# Patient Record
Sex: FEMALE | Race: WHITE | NOT HISPANIC OR LATINO | URBAN - METROPOLITAN AREA
[De-identification: names, ages, dates, MRNs, and addresses within clinical notes are randomized per-mention and may not be internally consistent; named-entity substitution may affect disease eponyms.]

---

## 2017-10-02 ENCOUNTER — OUTPATIENT (OUTPATIENT)
Dept: OUTPATIENT SERVICES | Facility: HOSPITAL | Age: 42
LOS: 1 days | End: 2017-10-02
Payer: COMMERCIAL

## 2017-10-02 DIAGNOSIS — Z3A.00 WEEKS OF GESTATION OF PREGNANCY NOT SPECIFIED: ICD-10-CM

## 2017-10-02 DIAGNOSIS — O26.899 OTHER SPECIFIED PREGNANCY RELATED CONDITIONS, UNSPECIFIED TRIMESTER: ICD-10-CM

## 2017-10-02 LAB — FIBRONECTIN FETAL SPEC QL: NEGATIVE — SIGNIFICANT CHANGE UP

## 2017-10-02 PROCEDURE — 76817 TRANSVAGINAL US OBSTETRIC: CPT

## 2017-10-02 PROCEDURE — 82731 ASSAY OF FETAL FIBRONECTIN: CPT

## 2017-10-02 PROCEDURE — 59025 FETAL NON-STRESS TEST: CPT

## 2017-10-02 PROCEDURE — 99214 OFFICE O/P EST MOD 30 MIN: CPT

## 2017-11-29 ENCOUNTER — OUTPATIENT (OUTPATIENT)
Dept: OUTPATIENT SERVICES | Facility: HOSPITAL | Age: 42
LOS: 1 days | End: 2017-11-29
Payer: COMMERCIAL

## 2017-11-29 DIAGNOSIS — Z01.818 ENCOUNTER FOR OTHER PREPROCEDURAL EXAMINATION: ICD-10-CM

## 2017-11-29 LAB
APTT BLD: 25.1 SEC — LOW (ref 27.5–37.4)
BLD GP AB SCN SERPL QL: NEGATIVE — SIGNIFICANT CHANGE UP
BLD GP AB SCN SERPL QL: NEGATIVE — SIGNIFICANT CHANGE UP
HCT VFR BLD CALC: 35.3 % — SIGNIFICANT CHANGE UP (ref 34.5–45)
HGB BLD-MCNC: 11.8 G/DL — SIGNIFICANT CHANGE UP (ref 11.5–15.5)
INR BLD: 0.84 — LOW (ref 0.88–1.16)
MCHC RBC-ENTMCNC: 31.8 PG — SIGNIFICANT CHANGE UP (ref 27–34)
MCHC RBC-ENTMCNC: 33.4 G/DL — SIGNIFICANT CHANGE UP (ref 32–36)
MCV RBC AUTO: 95.1 FL — SIGNIFICANT CHANGE UP (ref 80–100)
PLATELET # BLD AUTO: 217 K/UL — SIGNIFICANT CHANGE UP (ref 150–400)
PROTHROM AB SERPL-ACNC: 9.3 SEC — LOW (ref 9.8–12.7)
RBC # BLD: 3.71 M/UL — LOW (ref 3.8–5.2)
RBC # FLD: 12.8 % — SIGNIFICANT CHANGE UP (ref 10.3–16.9)
RH IG SCN BLD-IMP: POSITIVE — SIGNIFICANT CHANGE UP
RH IG SCN BLD-IMP: POSITIVE — SIGNIFICANT CHANGE UP
WBC # BLD: 8.9 K/UL — SIGNIFICANT CHANGE UP (ref 3.8–10.5)
WBC # FLD AUTO: 8.9 K/UL — SIGNIFICANT CHANGE UP (ref 3.8–10.5)

## 2017-11-29 PROCEDURE — 86900 BLOOD TYPING SEROLOGIC ABO: CPT

## 2017-11-29 PROCEDURE — 86901 BLOOD TYPING SEROLOGIC RH(D): CPT

## 2017-11-29 PROCEDURE — 85027 COMPLETE CBC AUTOMATED: CPT

## 2017-11-29 PROCEDURE — 86850 RBC ANTIBODY SCREEN: CPT

## 2017-11-29 PROCEDURE — 85730 THROMBOPLASTIN TIME PARTIAL: CPT

## 2017-11-29 PROCEDURE — 85610 PROTHROMBIN TIME: CPT

## 2017-12-01 ENCOUNTER — RESULT REVIEW (OUTPATIENT)
Age: 42
End: 2017-12-01

## 2017-12-01 ENCOUNTER — INPATIENT (INPATIENT)
Facility: HOSPITAL | Age: 42
LOS: 3 days | Discharge: HOME | End: 2017-12-05
Attending: OBSTETRICS & GYNECOLOGY | Admitting: OBSTETRICS & GYNECOLOGY
Payer: COMMERCIAL

## 2017-12-01 VITALS — WEIGHT: 149.91 LBS | HEIGHT: 63 IN

## 2017-12-01 LAB — T PALLIDUM AB TITR SER: NEGATIVE — SIGNIFICANT CHANGE UP

## 2017-12-01 RX ORDER — OXYCODONE AND ACETAMINOPHEN 5; 325 MG/1; MG/1
1 TABLET ORAL
Qty: 0 | Refills: 0 | Status: DISCONTINUED | OUTPATIENT
Start: 2017-12-01 | End: 2017-12-05

## 2017-12-01 RX ORDER — FERROUS SULFATE 325(65) MG
325 TABLET ORAL DAILY
Qty: 0 | Refills: 0 | Status: DISCONTINUED | OUTPATIENT
Start: 2017-12-01 | End: 2017-12-05

## 2017-12-01 RX ORDER — HEPARIN SODIUM 5000 [USP'U]/ML
5000 INJECTION INTRAVENOUS; SUBCUTANEOUS EVERY 12 HOURS
Qty: 0 | Refills: 0 | Status: DISCONTINUED | OUTPATIENT
Start: 2017-12-01 | End: 2017-12-05

## 2017-12-01 RX ORDER — SIMETHICONE 80 MG/1
80 TABLET, CHEWABLE ORAL EVERY 4 HOURS
Qty: 0 | Refills: 0 | Status: DISCONTINUED | OUTPATIENT
Start: 2017-12-01 | End: 2017-12-05

## 2017-12-01 RX ORDER — LANOLIN
1 OINTMENT (GRAM) TOPICAL
Qty: 0 | Refills: 0 | Status: DISCONTINUED | OUTPATIENT
Start: 2017-12-01 | End: 2017-12-05

## 2017-12-01 RX ORDER — CEFAZOLIN SODIUM 1 G
2000 VIAL (EA) INJECTION ONCE
Qty: 0 | Refills: 0 | Status: COMPLETED | OUTPATIENT
Start: 2017-12-01 | End: 2017-12-01

## 2017-12-01 RX ORDER — CITRIC ACID/SODIUM CITRATE 300-500 MG
30 SOLUTION, ORAL ORAL ONCE
Qty: 0 | Refills: 0 | Status: COMPLETED | OUTPATIENT
Start: 2017-12-01 | End: 2017-12-01

## 2017-12-01 RX ORDER — DOCUSATE SODIUM 100 MG
100 CAPSULE ORAL
Qty: 0 | Refills: 0 | Status: DISCONTINUED | OUTPATIENT
Start: 2017-12-01 | End: 2017-12-05

## 2017-12-01 RX ORDER — NALOXONE HYDROCHLORIDE 4 MG/.1ML
0.1 SPRAY NASAL
Qty: 0 | Refills: 0 | Status: DISCONTINUED | OUTPATIENT
Start: 2017-12-01 | End: 2017-12-05

## 2017-12-01 RX ORDER — ACETAMINOPHEN 500 MG
650 TABLET ORAL EVERY 6 HOURS
Qty: 0 | Refills: 0 | Status: DISCONTINUED | OUTPATIENT
Start: 2017-12-01 | End: 2017-12-05

## 2017-12-01 RX ORDER — ONDANSETRON 8 MG/1
4 TABLET, FILM COATED ORAL EVERY 6 HOURS
Qty: 0 | Refills: 0 | Status: DISCONTINUED | OUTPATIENT
Start: 2017-12-01 | End: 2017-12-05

## 2017-12-01 RX ORDER — DIPHENHYDRAMINE HCL 50 MG
25 CAPSULE ORAL EVERY 6 HOURS
Qty: 0 | Refills: 0 | Status: DISCONTINUED | OUTPATIENT
Start: 2017-12-01 | End: 2017-12-05

## 2017-12-01 RX ORDER — OXYTOCIN 10 UNIT/ML
41.67 VIAL (ML) INJECTION
Qty: 20 | Refills: 0 | Status: DISCONTINUED | OUTPATIENT
Start: 2017-12-01 | End: 2017-12-05

## 2017-12-01 RX ORDER — TETANUS TOXOID, REDUCED DIPHTHERIA TOXOID AND ACELLULAR PERTUSSIS VACCINE, ADSORBED 5; 2.5; 8; 8; 2.5 [IU]/.5ML; [IU]/.5ML; UG/.5ML; UG/.5ML; UG/.5ML
0.5 SUSPENSION INTRAMUSCULAR ONCE
Qty: 0 | Refills: 0 | Status: DISCONTINUED | OUTPATIENT
Start: 2017-12-01 | End: 2017-12-05

## 2017-12-01 RX ORDER — SODIUM CHLORIDE 9 MG/ML
1000 INJECTION, SOLUTION INTRAVENOUS
Qty: 0 | Refills: 0 | Status: DISCONTINUED | OUTPATIENT
Start: 2017-12-01 | End: 2017-12-01

## 2017-12-01 RX ORDER — OXYCODONE AND ACETAMINOPHEN 5; 325 MG/1; MG/1
2 TABLET ORAL EVERY 6 HOURS
Qty: 0 | Refills: 0 | Status: DISCONTINUED | OUTPATIENT
Start: 2017-12-01 | End: 2017-12-05

## 2017-12-01 RX ORDER — IBUPROFEN 200 MG
600 TABLET ORAL EVERY 6 HOURS
Qty: 0 | Refills: 0 | Status: DISCONTINUED | OUTPATIENT
Start: 2017-12-01 | End: 2017-12-05

## 2017-12-01 RX ORDER — GLYCERIN ADULT
1 SUPPOSITORY, RECTAL RECTAL AT BEDTIME
Qty: 0 | Refills: 0 | Status: DISCONTINUED | OUTPATIENT
Start: 2017-12-01 | End: 2017-12-05

## 2017-12-01 RX ORDER — METOCLOPRAMIDE HCL 10 MG
10 TABLET ORAL ONCE
Qty: 0 | Refills: 0 | Status: DISCONTINUED | OUTPATIENT
Start: 2017-12-01 | End: 2017-12-01

## 2017-12-01 RX ORDER — SODIUM CHLORIDE 9 MG/ML
1000 INJECTION, SOLUTION INTRAVENOUS ONCE
Qty: 0 | Refills: 0 | Status: COMPLETED | OUTPATIENT
Start: 2017-12-01 | End: 2017-12-01

## 2017-12-01 RX ADMIN — Medication 30 MILLILITER(S): at 09:38

## 2017-12-01 RX ADMIN — Medication 125 MILLIUNIT(S)/MIN: at 12:13

## 2017-12-01 RX ADMIN — Medication 100 MILLIGRAM(S): at 09:38

## 2017-12-01 RX ADMIN — SODIUM CHLORIDE 2000 MILLILITER(S): 9 INJECTION, SOLUTION INTRAVENOUS at 09:14

## 2017-12-01 RX ADMIN — ONDANSETRON 4 MILLIGRAM(S): 8 TABLET, FILM COATED ORAL at 16:49

## 2017-12-01 RX ADMIN — SODIUM CHLORIDE 125 MILLILITER(S): 9 INJECTION, SOLUTION INTRAVENOUS at 09:38

## 2017-12-02 LAB
HCT VFR BLD CALC: 26.8 % — LOW (ref 34.5–45)
HGB BLD-MCNC: 9.1 G/DL — LOW (ref 11.5–15.5)
MCHC RBC-ENTMCNC: 33 PG — SIGNIFICANT CHANGE UP (ref 27–34)
MCHC RBC-ENTMCNC: 34 G/DL — SIGNIFICANT CHANGE UP (ref 32–36)
MCV RBC AUTO: 97.1 FL — SIGNIFICANT CHANGE UP (ref 80–100)
PLATELET # BLD AUTO: 184 K/UL — SIGNIFICANT CHANGE UP (ref 150–400)
RBC # BLD: 2.76 M/UL — LOW (ref 3.8–5.2)
RBC # FLD: 13.1 % — SIGNIFICANT CHANGE UP (ref 10.3–16.9)
WBC # BLD: 11.4 K/UL — HIGH (ref 3.8–10.5)
WBC # FLD AUTO: 11.4 K/UL — HIGH (ref 3.8–10.5)

## 2017-12-02 RX ADMIN — Medication 600 MILLIGRAM(S): at 18:40

## 2017-12-02 RX ADMIN — Medication 600 MILLIGRAM(S): at 13:10

## 2017-12-02 RX ADMIN — Medication 600 MILLIGRAM(S): at 06:50

## 2017-12-02 RX ADMIN — Medication 1 TABLET(S): at 12:13

## 2017-12-02 RX ADMIN — Medication 600 MILLIGRAM(S): at 00:03

## 2017-12-02 RX ADMIN — Medication 600 MILLIGRAM(S): at 17:53

## 2017-12-02 RX ADMIN — Medication 600 MILLIGRAM(S): at 07:30

## 2017-12-02 RX ADMIN — Medication 1 APPLICATION(S): at 06:49

## 2017-12-02 RX ADMIN — HEPARIN SODIUM 5000 UNIT(S): 5000 INJECTION INTRAVENOUS; SUBCUTANEOUS at 17:52

## 2017-12-02 RX ADMIN — Medication 600 MILLIGRAM(S): at 12:13

## 2017-12-02 RX ADMIN — Medication 650 MILLIGRAM(S): at 22:20

## 2017-12-02 RX ADMIN — HEPARIN SODIUM 5000 UNIT(S): 5000 INJECTION INTRAVENOUS; SUBCUTANEOUS at 06:49

## 2017-12-02 RX ADMIN — Medication 600 MILLIGRAM(S): at 00:45

## 2017-12-02 RX ADMIN — Medication 100 MILLIGRAM(S): at 12:13

## 2017-12-02 RX ADMIN — Medication 650 MILLIGRAM(S): at 21:49

## 2017-12-02 RX ADMIN — Medication 325 MILLIGRAM(S): at 12:13

## 2017-12-02 NOTE — PROGRESS NOTE ADULT - ASSESSMENT
A/P   42y  s/p  section, POD #1, stable  -  Pain: PO motrin, percocet as needed   -  Post-operatively labs: Hgb 9.1 down from 11.8, no symptoms of anemia  -  GI: tolerating clears, passing gas, ADAT  -  : mota in situ; DC this AM, f/u TOV  -  DVT prophylaxis: ambulation, SCDs, SQH  -  Dispo: POD 3 or 4

## 2017-12-02 NOTE — PROGRESS NOTE ADULT - SUBJECTIVE AND OBJECTIVE BOX
Patient evaluated at bedside this morning, resting comfortable in bed, with no acute events overnight.  She reports pain is well controlled with motrin.   She denies headache, dizziness, chest pain, palpitations, shortness of breathe, nausea, vomiting or heavy vaginal bleeding.  She has been tolerating clear liquids without nausea. Wesley remains in place.  Has not moved out of bed since surgery, plans to move to chair this morning.   Physical Exam:  Vital Signs Last 24 Hrs  T(C): 36.7 (02 Dec 2017 06:10), Max: 36.7 (02 Dec 2017 06:10)  T(F): 98.1 (02 Dec 2017 06:10), Max: 98.1 (02 Dec 2017 06:10)  HR: 74 (02 Dec 2017 06:10) (57 - 93)  BP: 90/50 (02 Dec 2017 06:10) (90/50 - 137/77)  BP(mean): --  RR: 18 (02 Dec 2017 06:10) (12 - 20)  SpO2: 95% (02 Dec 2017 06:10) (95% - 99%)    GA: NAD, A+0 x 3  CV: RRR, no murmurs/rubs  Pulm: CTAB, no wheezes/rhonchi  Breasts: soft, nontender, no palpable masses  Abd: + BS, soft, nontender, nondistended, no rebound or guarding, incision clean, dry and intact, uterus firm at midline,  fb below umbilicus  : lochia WNL  Extremities: no swelling or calf tenderness, reflexes +2 bilaterally, SCD in place                            9.1    11.4  )-----------( 184      ( 02 Dec 2017 06:16 )             26.8               acetaminophen   Tablet 650 milliGRAM(s) Oral every 6 hours PRN  acetaminophen   Tablet. 650 milliGRAM(s) Oral every 6 hours PRN  diphenhydrAMINE   Capsule 25 milliGRAM(s) Oral every 6 hours PRN  diphtheria/tetanus/pertussis (acellular) Vaccine (ADAcel) 0.5 milliLiter(s) IntraMuscular once  docusate sodium 100 milliGRAM(s) Oral two times a day PRN  ferrous    sulfate 325 milliGRAM(s) Oral daily  glycerin Suppository - Adult 1 Suppository(s) Rectal at bedtime PRN  heparin  Injectable 5000 Unit(s) SubCutaneous every 12 hours  ibuprofen  Tablet 600 milliGRAM(s) Oral every 6 hours PRN  lanolin Ointment 1 Application(s) Topical every 3 hours PRN  naloxone Injectable 0.1 milliGRAM(s) IV Push every 3 minutes PRN  ondansetron Injectable 4 milliGRAM(s) IV Push every 6 hours PRN  oxyCODONE    5 mG/acetaminophen 325 mG 1 Tablet(s) Oral every 3 hours PRN  oxyCODONE    5 mG/acetaminophen 325 mG 2 Tablet(s) Oral every 6 hours PRN  oxytocin Infusion 41.667 milliUNIT(s)/Min IV Continuous <Continuous>  prenatal multivitamin 1 Tablet(s) Oral daily  simethicone 80 milliGRAM(s) Chew every 4 hours PRN

## 2017-12-03 RX ADMIN — Medication 325 MILLIGRAM(S): at 12:50

## 2017-12-03 RX ADMIN — Medication 650 MILLIGRAM(S): at 03:39

## 2017-12-03 RX ADMIN — Medication 600 MILLIGRAM(S): at 06:49

## 2017-12-03 RX ADMIN — Medication 600 MILLIGRAM(S): at 13:30

## 2017-12-03 RX ADMIN — Medication 600 MILLIGRAM(S): at 21:21

## 2017-12-03 RX ADMIN — Medication 600 MILLIGRAM(S): at 22:14

## 2017-12-03 RX ADMIN — Medication 600 MILLIGRAM(S): at 12:50

## 2017-12-03 RX ADMIN — HEPARIN SODIUM 5000 UNIT(S): 5000 INJECTION INTRAVENOUS; SUBCUTANEOUS at 06:46

## 2017-12-03 RX ADMIN — Medication 650 MILLIGRAM(S): at 04:01

## 2017-12-03 RX ADMIN — Medication 600 MILLIGRAM(S): at 00:40

## 2017-12-03 RX ADMIN — Medication 650 MILLIGRAM(S): at 16:12

## 2017-12-03 RX ADMIN — Medication 100 MILLIGRAM(S): at 12:50

## 2017-12-03 RX ADMIN — Medication 1 TABLET(S): at 12:50

## 2017-12-03 RX ADMIN — HEPARIN SODIUM 5000 UNIT(S): 5000 INJECTION INTRAVENOUS; SUBCUTANEOUS at 20:11

## 2017-12-03 RX ADMIN — Medication 650 MILLIGRAM(S): at 17:00

## 2017-12-03 RX ADMIN — Medication 600 MILLIGRAM(S): at 00:02

## 2017-12-03 NOTE — PROGRESS NOTE ADULT - SUBJECTIVE AND OBJECTIVE BOX
Patient evaluated at bedside.   She reports pain is well controlled.  She denies headache, dizziness, chest pain, palpitations, shortness of breathe, nausea, vomiting or heavy vaginal bleeding.  She has been ambulating without assistance, voiding spontaneously, passing gas, tolerating regular diet and is breastfeeding.    Physical Exam:  Vital Signs Last 24 Hrs  T(C): 36.6 (03 Dec 2017 06:55), Max: 36.9 (02 Dec 2017 18:53)  T(F): 97.8 (03 Dec 2017 06:55), Max: 98.5 (02 Dec 2017 18:53)  HR: 69 (03 Dec 2017 06:55) (69 - 79)  BP: 97/60 (03 Dec 2017 06:55) (97/60 - 110/74)  BP(mean): --  RR: 18 (03 Dec 2017 06:55) (18 - 18)  SpO2: 97% (03 Dec 2017 06:55) (96% - 97%)    12-02 @ 07:01  -  12-03 @ 07:00  --------------------------------------------------------  IN: 0 mL / OUT: 800 mL / NET: -800 mL        GA: NAD, A+0 x 3  CV: RRR  Pulm: Normal work of breathing  Breasts: soft, nontender, no palpable masses  Abd: + BS, soft, nontender, nondistended, no rebound or guarding, uterus firm at midline,  fundus below umbilicus  Incision: well approximated, no erythema or discharge  : lochia WNL  Extremities: no swelling or calf tenderness                            9.1    11.4  )-----------( 184      ( 02 Dec 2017 06:16 )             26.8

## 2017-12-03 NOTE — PROGRESS NOTE ADULT - ASSESSMENT
A/P  yo 42y s/p c/s, POD #2  ,stable  1. Pain: well controlled on OPM  2. GI: tolerating regular diet  3. : voiding spontaneously  4. DVT prophylaxis: SQH, ambulation  5. Dispo: POD 3 or 4

## 2017-12-03 NOTE — PROVIDER CONTACT NOTE (OTHER) - SITUATION
Dr. Rosen office called. Message left answering service to notify service baby was on 4woll and transfer from [facility].

## 2017-12-04 ENCOUNTER — TRANSCRIPTION ENCOUNTER (OUTPATIENT)
Age: 42
End: 2017-12-04

## 2017-12-04 RX ADMIN — Medication 600 MILLIGRAM(S): at 21:21

## 2017-12-04 RX ADMIN — Medication 650 MILLIGRAM(S): at 00:50

## 2017-12-04 RX ADMIN — Medication 650 MILLIGRAM(S): at 19:42

## 2017-12-04 RX ADMIN — Medication 650 MILLIGRAM(S): at 18:22

## 2017-12-04 RX ADMIN — Medication 600 MILLIGRAM(S): at 05:04

## 2017-12-04 RX ADMIN — HEPARIN SODIUM 5000 UNIT(S): 5000 INJECTION INTRAVENOUS; SUBCUTANEOUS at 06:45

## 2017-12-04 RX ADMIN — Medication 600 MILLIGRAM(S): at 22:00

## 2017-12-04 RX ADMIN — Medication 600 MILLIGRAM(S): at 06:00

## 2017-12-04 RX ADMIN — Medication 1 TABLET(S): at 13:02

## 2017-12-04 RX ADMIN — Medication 650 MILLIGRAM(S): at 00:09

## 2017-12-04 RX ADMIN — Medication 600 MILLIGRAM(S): at 13:02

## 2017-12-04 RX ADMIN — HEPARIN SODIUM 5000 UNIT(S): 5000 INJECTION INTRAVENOUS; SUBCUTANEOUS at 18:22

## 2017-12-04 RX ADMIN — Medication 100 MILLIGRAM(S): at 13:02

## 2017-12-04 RX ADMIN — Medication 600 MILLIGRAM(S): at 14:00

## 2017-12-04 RX ADMIN — Medication 325 MILLIGRAM(S): at 13:02

## 2017-12-04 NOTE — PROGRESS NOTE ADULT - SUBJECTIVE AND OBJECTIVE BOX
Patient evaluated at bedside.   She reports pain is well controlled with OPM.  She has been ambulating without assistance, voiding spontaneously, passing gas, tolerating regular diet and is breastfeeding.    She denies HA, dizziness, CP, palpitations, SOB, n/v, or heavy vaginal bleeding.    Physical Exam:  vss  Gen: NAD  Abd: + BS, soft, nontender, nondistended, no rebound or guarding  Incision clean, dry and intact  uterus firm at midline,   fb below umbilicus  : lochia WNL  Extremities: no swelling or calf tenderness    pod3 cs stable

## 2017-12-04 NOTE — PROGRESS NOTE ADULT - SUBJECTIVE AND OBJECTIVE BOX
Patient evaluated at bedside.   She reports pain is well controlled with Motrin and Tylenol. Will try the abdominal binder today.   She denies headache, dizziness, chest pain, palpitations, shortness of breathe, nausea, vomiting or heavy vaginal bleeding.  She has been ambulating without assistance, voiding spontaneously, passing gas, tolerating regular diet and is breastfeeding.    Physical Exam:  Vital Signs Last 24 Hrs  T(C): 36.7 (03 Dec 2017 19:57), Max: 36.8 (03 Dec 2017 12:12)  T(F): 98 (03 Dec 2017 19:57), Max: 98.3 (03 Dec 2017 12:12)  HR: 65 (03 Dec 2017 19:57) (65 - 77)  BP: 96/69 (03 Dec 2017 19:57) (96/69 - 99/66)  BP(mean): --  RR: 17 (03 Dec 2017 19:57) (17 - 18)  SpO2: 98% (03 Dec 2017 19:57) (97% - 99%)    12-02 @ 07:01  -  12-03 @ 07:00  --------------------------------------------------------  IN: 0 mL / OUT: 800 mL / NET: -800 mL        GA: NAD, resting comfortably   Abd: + BS, soft, nontender, nondistended, no rebound or guarding, uterus firm at midline,  fb below umbilicus  Incision: well approximated, no erythema or discharge  : lochia WNL  Extremities: no swelling or calf tenderness

## 2017-12-04 NOTE — DISCHARGE NOTE OB - PATIENT PORTAL LINK FT
“You can access the [employer] Patient Portal, offered by [facility], by registering with the following website: [email or web address]”

## 2017-12-04 NOTE — DISCHARGE NOTE OB - CARE PROVIDER_API CALL
Nicole Kim), Obstetrics and Gynecology  [address] [place] [address]  Phone: [phone]  Fax: [phone]

## 2017-12-05 VITALS
HEART RATE: 62 BPM | SYSTOLIC BLOOD PRESSURE: 110 MMHG | OXYGEN SATURATION: 98 % | RESPIRATION RATE: 18 BRPM | TEMPERATURE: 98 F | DIASTOLIC BLOOD PRESSURE: 72 MMHG

## 2017-12-05 PROCEDURE — 88307 TISSUE EXAM BY PATHOLOGIST: CPT

## 2017-12-05 PROCEDURE — 85027 COMPLETE CBC AUTOMATED: CPT

## 2017-12-05 PROCEDURE — 86780 TREPONEMA PALLIDUM: CPT

## 2017-12-05 PROCEDURE — 36415 COLL VENOUS BLD VENIPUNCTURE: CPT

## 2017-12-05 RX ORDER — HYDROCORTISONE 1 %
1 OINTMENT (GRAM) TOPICAL DAILY
Qty: 0 | Refills: 0 | Status: DISCONTINUED | OUTPATIENT
Start: 2017-12-05 | End: 2017-12-05

## 2017-12-05 RX ADMIN — Medication 1 APPLICATION(S): at 11:40

## 2017-12-05 RX ADMIN — Medication 100 MILLIGRAM(S): at 11:40

## 2017-12-05 RX ADMIN — Medication 650 MILLIGRAM(S): at 11:40

## 2017-12-05 RX ADMIN — Medication 600 MILLIGRAM(S): at 09:00

## 2017-12-05 RX ADMIN — Medication 650 MILLIGRAM(S): at 12:30

## 2017-12-05 RX ADMIN — Medication 325 MILLIGRAM(S): at 11:40

## 2017-12-05 RX ADMIN — Medication 650 MILLIGRAM(S): at 03:45

## 2017-12-05 RX ADMIN — Medication 1 TABLET(S): at 11:40

## 2017-12-05 RX ADMIN — Medication 600 MILLIGRAM(S): at 08:18

## 2017-12-05 RX ADMIN — Medication 650 MILLIGRAM(S): at 03:04

## 2017-12-05 NOTE — PROGRESS NOTE ADULT - ASSESSMENT
A/P  41yo s/p c/s, POD #4, stable and meeting postoperative milestones appropriately.   1. Pain: controlled with OPM; massage, pumping, heating pads and ice packs to breasts PRN  2. GI: + flatus, regular diet as tolerated   3. : voiding   4. DVT prophylaxis: ambulation  5. Dispo: DC to home today

## 2017-12-05 NOTE — PROGRESS NOTE ADULT - SUBJECTIVE AND OBJECTIVE BOX
Patient evaluated at bedside.   She reports pain is well controlled. Reports breast fullness and aching,. Will continue massage, pumping and ice or heating packs.   She denies headache, dizziness, chest pain, palpitations, shortness of breathe, nausea, vomiting or heavy vaginal bleeding.  She has been ambulating without assistance, voiding spontaneously, passing gas, tolerating regular diet and is breastfeeding. Patient feels ready to go home today.     Physical Exam:  Vital Signs Last 24 Hrs  T(C): 36.2 (05 Dec 2017 06:24), Max: 36.8 (04 Dec 2017 18:10)  T(F): 97.1 (05 Dec 2017 06:24), Max: 98.2 (04 Dec 2017 18:10)  HR: 64 (05 Dec 2017 06:24) (64 - 80)  BP: 101/66 (05 Dec 2017 06:24) (101/66 - 117/69)  BP(mean): --  RR: 18 (05 Dec 2017 06:24) (17 - 18)  SpO2: 97% (05 Dec 2017 06:24) (97% - 100%)      GA: NAD, resting comfortably   Abd: soft, nontender, nondistended, no rebound or guarding, uterus firm at midline,  fb below umbilicus  Incision: well approximated, no erythema or discharge  : lochia WNL  Extremities: no swelling or calf tenderness

## 2017-12-07 DIAGNOSIS — Z3A.40 40 WEEKS GESTATION OF PREGNANCY: ICD-10-CM

## 2017-12-08 LAB
SURGICAL PATHOLOGY STUDY: SIGNIFICANT CHANGE UP
SURGICAL PATHOLOGY STUDY: SIGNIFICANT CHANGE UP

## 2018-08-01 NOTE — PATIENT PROFILE OB - ABILITY TO HEAR (WITH HEARING AID OR HEARING APPLIANCE IF NORMALLY USED):
Please notify patient, the colon polyps were benign.   Adequate: hears normal conversation without difficulty

## 2019-01-18 PROCEDURE — 76817 TRANSVAGINAL US OBSTETRIC: CPT

## 2019-01-18 PROCEDURE — 76811 OB US DETAILED SNGL FETUS: CPT

## 2019-02-28 PROBLEM — Z00.00 ENCOUNTER FOR PREVENTIVE HEALTH EXAMINATION: Status: ACTIVE | Noted: 2019-02-28

## 2019-04-26 ENCOUNTER — APPOINTMENT (OUTPATIENT)
Dept: ANTEPARTUM | Facility: CLINIC | Age: 44
End: 2019-04-26
Payer: COMMERCIAL

## 2019-04-26 PROCEDURE — 76819 FETAL BIOPHYS PROFIL W/O NST: CPT

## 2019-04-26 PROCEDURE — 76816 OB US FOLLOW-UP PER FETUS: CPT

## 2019-04-26 PROCEDURE — 76820 UMBILICAL ARTERY ECHO: CPT

## 2019-05-03 ENCOUNTER — APPOINTMENT (OUTPATIENT)
Dept: ANTEPARTUM | Facility: CLINIC | Age: 44
End: 2019-05-03
Payer: COMMERCIAL

## 2019-05-03 PROCEDURE — 76819 FETAL BIOPHYS PROFIL W/O NST: CPT

## 2019-05-03 PROCEDURE — 76820 UMBILICAL ARTERY ECHO: CPT

## 2019-05-10 ENCOUNTER — APPOINTMENT (OUTPATIENT)
Dept: ANTEPARTUM | Facility: CLINIC | Age: 44
End: 2019-05-10
Payer: COMMERCIAL

## 2019-05-10 PROCEDURE — 76820 UMBILICAL ARTERY ECHO: CPT

## 2019-05-10 PROCEDURE — 76819 FETAL BIOPHYS PROFIL W/O NST: CPT

## 2019-05-10 PROCEDURE — 76816 OB US FOLLOW-UP PER FETUS: CPT

## 2019-05-17 ENCOUNTER — APPOINTMENT (OUTPATIENT)
Dept: ANTEPARTUM | Facility: CLINIC | Age: 44
End: 2019-05-17
Payer: COMMERCIAL

## 2019-05-17 PROCEDURE — 76819 FETAL BIOPHYS PROFIL W/O NST: CPT

## 2019-05-17 PROCEDURE — 76820 UMBILICAL ARTERY ECHO: CPT

## 2019-05-20 ENCOUNTER — RESULT REVIEW (OUTPATIENT)
Age: 44
End: 2019-05-20

## 2019-05-20 ENCOUNTER — INPATIENT (INPATIENT)
Facility: HOSPITAL | Age: 44
LOS: 2 days | Discharge: HOME | End: 2019-05-23
Attending: OBSTETRICS & GYNECOLOGY | Admitting: OBSTETRICS & GYNECOLOGY
Payer: COMMERCIAL

## 2019-05-20 VITALS — WEIGHT: 152.12 LBS | HEIGHT: 63 IN

## 2019-05-20 LAB
BASOPHILS # BLD AUTO: 0.08 K/UL — SIGNIFICANT CHANGE UP (ref 0–0.2)
BASOPHILS NFR BLD AUTO: 0.9 % — SIGNIFICANT CHANGE UP (ref 0–2)
EOSINOPHIL # BLD AUTO: 0.08 K/UL — SIGNIFICANT CHANGE UP (ref 0–0.5)
EOSINOPHIL NFR BLD AUTO: 0.9 % — SIGNIFICANT CHANGE UP (ref 0–6)
HCT VFR BLD CALC: 33.8 % — LOW (ref 34.5–45)
HGB BLD-MCNC: 11.2 G/DL — LOW (ref 11.5–15.5)
LYMPHOCYTES # BLD AUTO: 1.91 K/UL — SIGNIFICANT CHANGE UP (ref 1–3.3)
LYMPHOCYTES # BLD AUTO: 22.6 % — SIGNIFICANT CHANGE UP (ref 13–44)
MCHC RBC-ENTMCNC: 32.7 PG — SIGNIFICANT CHANGE UP (ref 27–34)
MCHC RBC-ENTMCNC: 33.1 GM/DL — SIGNIFICANT CHANGE UP (ref 32–36)
MCV RBC AUTO: 98.8 FL — SIGNIFICANT CHANGE UP (ref 80–100)
MONOCYTES # BLD AUTO: 0.66 K/UL — SIGNIFICANT CHANGE UP (ref 0–0.9)
MONOCYTES NFR BLD AUTO: 7.8 % — SIGNIFICANT CHANGE UP (ref 2–14)
NEUTROPHILS # BLD AUTO: 5.58 K/UL — SIGNIFICANT CHANGE UP (ref 1.8–7.4)
NEUTROPHILS NFR BLD AUTO: 64.3 % — SIGNIFICANT CHANGE UP (ref 43–77)
PLATELET # BLD AUTO: 194 K/UL — SIGNIFICANT CHANGE UP (ref 150–400)
RBC # BLD: 3.42 M/UL — LOW (ref 3.8–5.2)
RBC # FLD: 12.6 % — SIGNIFICANT CHANGE UP (ref 10.3–14.5)
WBC # BLD: 8.46 K/UL — SIGNIFICANT CHANGE UP (ref 3.8–10.5)
WBC # FLD AUTO: 8.46 K/UL — SIGNIFICANT CHANGE UP (ref 3.8–10.5)

## 2019-05-20 RX ORDER — OXYTOCIN 10 UNIT/ML
333.33 VIAL (ML) INJECTION
Qty: 20 | Refills: 0 | Status: DISCONTINUED | OUTPATIENT
Start: 2019-05-20 | End: 2019-05-20

## 2019-05-20 RX ORDER — IBUPROFEN 200 MG
600 TABLET ORAL EVERY 6 HOURS
Refills: 0 | Status: COMPLETED | OUTPATIENT
Start: 2019-05-20 | End: 2020-04-17

## 2019-05-20 RX ORDER — ACETAMINOPHEN 500 MG
975 TABLET ORAL EVERY 6 HOURS
Refills: 0 | Status: DISCONTINUED | OUTPATIENT
Start: 2019-05-20 | End: 2019-05-23

## 2019-05-20 RX ORDER — OXYCODONE HYDROCHLORIDE 5 MG/1
5 TABLET ORAL ONCE
Refills: 0 | Status: DISCONTINUED | OUTPATIENT
Start: 2019-05-20 | End: 2019-05-23

## 2019-05-20 RX ORDER — FERROUS SULFATE 325(65) MG
325 TABLET ORAL DAILY
Refills: 0 | Status: DISCONTINUED | OUTPATIENT
Start: 2019-05-20 | End: 2019-05-23

## 2019-05-20 RX ORDER — SODIUM CHLORIDE 9 MG/ML
1000 INJECTION, SOLUTION INTRAVENOUS
Refills: 0 | Status: DISCONTINUED | OUTPATIENT
Start: 2019-05-20 | End: 2019-05-20

## 2019-05-20 RX ORDER — OXYCODONE HYDROCHLORIDE 5 MG/1
5 TABLET ORAL
Refills: 0 | Status: DISCONTINUED | OUTPATIENT
Start: 2019-05-20 | End: 2019-05-23

## 2019-05-20 RX ORDER — FAMOTIDINE 10 MG/ML
20 INJECTION INTRAVENOUS ONCE
Refills: 0 | Status: DISCONTINUED | OUTPATIENT
Start: 2019-05-20 | End: 2019-05-20

## 2019-05-20 RX ORDER — GLYCERIN ADULT
1 SUPPOSITORY, RECTAL RECTAL AT BEDTIME
Refills: 0 | Status: DISCONTINUED | OUTPATIENT
Start: 2019-05-20 | End: 2019-05-23

## 2019-05-20 RX ORDER — SIMETHICONE 80 MG/1
80 TABLET, CHEWABLE ORAL EVERY 4 HOURS
Refills: 0 | Status: DISCONTINUED | OUTPATIENT
Start: 2019-05-20 | End: 2019-05-23

## 2019-05-20 RX ORDER — SODIUM CHLORIDE 9 MG/ML
1000 INJECTION, SOLUTION INTRAVENOUS
Refills: 0 | Status: DISCONTINUED | OUTPATIENT
Start: 2019-05-20 | End: 2019-05-23

## 2019-05-20 RX ORDER — NALOXONE HYDROCHLORIDE 4 MG/.1ML
0.1 SPRAY NASAL
Refills: 0 | Status: DISCONTINUED | OUTPATIENT
Start: 2019-05-20 | End: 2019-05-23

## 2019-05-20 RX ORDER — MAGNESIUM HYDROXIDE 400 MG/1
30 TABLET, CHEWABLE ORAL
Refills: 0 | Status: DISCONTINUED | OUTPATIENT
Start: 2019-05-20 | End: 2019-05-23

## 2019-05-20 RX ORDER — DIPHENHYDRAMINE HCL 50 MG
25 CAPSULE ORAL EVERY 6 HOURS
Refills: 0 | Status: DISCONTINUED | OUTPATIENT
Start: 2019-05-20 | End: 2019-05-23

## 2019-05-20 RX ORDER — DEXAMETHASONE 0.5 MG/5ML
4 ELIXIR ORAL EVERY 6 HOURS
Refills: 0 | Status: DISCONTINUED | OUTPATIENT
Start: 2019-05-20 | End: 2019-05-23

## 2019-05-20 RX ORDER — MORPHINE SULFATE 50 MG/1
0.3 CAPSULE, EXTENDED RELEASE ORAL ONCE
Refills: 0 | Status: DISCONTINUED | OUTPATIENT
Start: 2019-05-20 | End: 2019-05-23

## 2019-05-20 RX ORDER — SODIUM CHLORIDE 9 MG/ML
1000 INJECTION, SOLUTION INTRAVENOUS ONCE
Refills: 0 | Status: COMPLETED | OUTPATIENT
Start: 2019-05-20 | End: 2019-05-20

## 2019-05-20 RX ORDER — ONDANSETRON 8 MG/1
4 TABLET, FILM COATED ORAL EVERY 6 HOURS
Refills: 0 | Status: DISCONTINUED | OUTPATIENT
Start: 2019-05-20 | End: 2019-05-23

## 2019-05-20 RX ORDER — LANOLIN
1 OINTMENT (GRAM) TOPICAL EVERY 6 HOURS
Refills: 0 | Status: DISCONTINUED | OUTPATIENT
Start: 2019-05-20 | End: 2019-05-23

## 2019-05-20 RX ORDER — CITRIC ACID/SODIUM CITRATE 300-500 MG
30 SOLUTION, ORAL ORAL ONCE
Refills: 0 | Status: COMPLETED | OUTPATIENT
Start: 2019-05-20 | End: 2019-05-20

## 2019-05-20 RX ORDER — TETANUS TOXOID, REDUCED DIPHTHERIA TOXOID AND ACELLULAR PERTUSSIS VACCINE, ADSORBED 5; 2.5; 8; 8; 2.5 [IU]/.5ML; [IU]/.5ML; UG/.5ML; UG/.5ML; UG/.5ML
0.5 SUSPENSION INTRAMUSCULAR ONCE
Refills: 0 | Status: COMPLETED | OUTPATIENT
Start: 2019-05-20

## 2019-05-20 RX ORDER — METOCLOPRAMIDE HCL 10 MG
10 TABLET ORAL ONCE
Refills: 0 | Status: DISCONTINUED | OUTPATIENT
Start: 2019-05-20 | End: 2019-05-20

## 2019-05-20 RX ORDER — DOCUSATE SODIUM 100 MG
100 CAPSULE ORAL
Refills: 0 | Status: DISCONTINUED | OUTPATIENT
Start: 2019-05-20 | End: 2019-05-23

## 2019-05-20 RX ORDER — OXYTOCIN 10 UNIT/ML
333.33 VIAL (ML) INJECTION
Qty: 20 | Refills: 0 | Status: DISCONTINUED | OUTPATIENT
Start: 2019-05-20 | End: 2019-05-23

## 2019-05-20 RX ORDER — HEPARIN SODIUM 5000 [USP'U]/ML
5000 INJECTION INTRAVENOUS; SUBCUTANEOUS EVERY 12 HOURS
Refills: 0 | Status: DISCONTINUED | OUTPATIENT
Start: 2019-05-21 | End: 2019-05-23

## 2019-05-20 RX ORDER — CEFAZOLIN SODIUM 1 G
2000 VIAL (EA) INJECTION ONCE
Refills: 0 | Status: COMPLETED | OUTPATIENT
Start: 2019-05-20 | End: 2019-05-20

## 2019-05-20 RX ADMIN — SODIUM CHLORIDE 2000 MILLILITER(S): 9 INJECTION, SOLUTION INTRAVENOUS at 14:00

## 2019-05-20 RX ADMIN — Medication 100 MILLIGRAM(S): at 20:31

## 2019-05-20 RX ADMIN — SODIUM CHLORIDE 125 MILLILITER(S): 9 INJECTION, SOLUTION INTRAVENOUS at 22:09

## 2019-05-20 RX ADMIN — Medication 30 MILLILITER(S): at 15:41

## 2019-05-20 RX ADMIN — Medication 1000 MILLIUNIT(S)/MIN: at 18:03

## 2019-05-20 RX ADMIN — Medication 100 MILLIGRAM(S): at 20:32

## 2019-05-20 RX ADMIN — SODIUM CHLORIDE 125 MILLILITER(S): 9 INJECTION, SOLUTION INTRAVENOUS at 13:33

## 2019-05-20 RX ADMIN — Medication 100 MILLIGRAM(S): at 15:55

## 2019-05-20 NOTE — PATIENT PROFILE OB - NS PRO TDAP RECEIVED Y/N
Physical Exam  Mallampati: II  TM Distance: >3 FB  Neck ROM: Full  cardiovascular exam normal  pulmonary exam normal  abdominal exam normal  dental exam normal      Anesthesia Plan  ASA Status: 1  emergent  Anesthesia Type: General  Induction: Intravenous  Preferred Airway Type: ETT  Reviewed: Past Med History, Medications, Allergies, Problem List, NPO Status, Pre-Induction Reassessment, Patient Summary and Lab Results  The proposed anesthetic plan, including its risks and benefits, have been discussed with the Patient - along with the risks and benefits of alternatives.  Questions were encouraged and answered and the patient and/or representative agrees to proceed.  Blood Products: Not Anticipated  Plan Comments: Risks of anesthesia discussed with patient.  Risks including but not limited to sore throat, PONV, dental trauma, reaction to medications, pulmonary and cardiac complications, and potential change in anesthestic plan depending upon situation were discussed. Pt/family agree with risks and wish to proceed.  All questions were answered.      Anesthesia ROS/Med Hx    Overall Review:    Pt. has no history of anesthetic complications  Pt. does not have difficult airway    Pulmonary Review:  The patient is not a smoker.    
yes...

## 2019-05-20 NOTE — PATIENT PROFILE OB - NSTOBACCONEVERSMOKERY/N_GEN_A
FEEDING: Milk - 1%                           Diet - fruits and meats  SLEEPIN hours at night  URINATION: no enuresis or daytime incontinence  STOOLS: normal  SCHOOL HISTORY:       School - [employer]        Grade - 6       Academic performance - normal       Extracurricular Activities - YES, volleyball, basketball, baseball                 CONCERNS:  none    Over the last 2 weeks, how often have you been bothered by the following problems?        PHQ2 Score:  0  1. Little interest or pleasure in doing things?:  Not at all  2. Feeling down, depressed, irritable or hopeless?:  Not at all     Medications reviewed and updated.  Denies known Latex allergy or symptoms of Latex sensitivity.  History   Smoking Status   • Never Smoker   Smokeless Tobacco   • Not on file     Comment: No one smokes in home.        No

## 2019-05-21 LAB
BASOPHILS # BLD AUTO: 0.03 K/UL — SIGNIFICANT CHANGE UP (ref 0–0.2)
BASOPHILS NFR BLD AUTO: 0.3 % — SIGNIFICANT CHANGE UP (ref 0–2)
EOSINOPHIL # BLD AUTO: 0.02 K/UL — SIGNIFICANT CHANGE UP (ref 0–0.5)
EOSINOPHIL NFR BLD AUTO: 0.2 % — SIGNIFICANT CHANGE UP (ref 0–6)
HCT VFR BLD CALC: 31.3 % — LOW (ref 34.5–45)
HGB BLD-MCNC: 10.4 G/DL — LOW (ref 11.5–15.5)
IMM GRANULOCYTES NFR BLD AUTO: 0.9 % — SIGNIFICANT CHANGE UP (ref 0–1.5)
LYMPHOCYTES # BLD AUTO: 1.81 K/UL — SIGNIFICANT CHANGE UP (ref 1–3.3)
LYMPHOCYTES # BLD AUTO: 16.5 % — SIGNIFICANT CHANGE UP (ref 13–44)
MCHC RBC-ENTMCNC: 33.2 GM/DL — SIGNIFICANT CHANGE UP (ref 32–36)
MCHC RBC-ENTMCNC: 33.3 PG — SIGNIFICANT CHANGE UP (ref 27–34)
MCV RBC AUTO: 100.3 FL — HIGH (ref 80–100)
MONOCYTES # BLD AUTO: 0.84 K/UL — SIGNIFICANT CHANGE UP (ref 0–0.9)
MONOCYTES NFR BLD AUTO: 7.7 % — SIGNIFICANT CHANGE UP (ref 2–14)
NEUTROPHILS # BLD AUTO: 8.18 K/UL — HIGH (ref 1.8–7.4)
NEUTROPHILS NFR BLD AUTO: 74.4 % — SIGNIFICANT CHANGE UP (ref 43–77)
NRBC # BLD: 0 /100 WBCS — SIGNIFICANT CHANGE UP (ref 0–0)
PLATELET # BLD AUTO: 182 K/UL — SIGNIFICANT CHANGE UP (ref 150–400)
RBC # BLD: 3.12 M/UL — LOW (ref 3.8–5.2)
RBC # FLD: 12.7 % — SIGNIFICANT CHANGE UP (ref 10.3–14.5)
T PALLIDUM AB TITR SER: NEGATIVE — SIGNIFICANT CHANGE UP
WBC # BLD: 10.98 K/UL — HIGH (ref 3.8–10.5)
WBC # FLD AUTO: 10.98 K/UL — HIGH (ref 3.8–10.5)

## 2019-05-21 RX ORDER — LEVOTHYROXINE SODIUM 125 MCG
25 TABLET ORAL DAILY
Refills: 0 | Status: DISCONTINUED | OUTPATIENT
Start: 2019-05-21 | End: 2019-05-23

## 2019-05-21 RX ORDER — IBUPROFEN 200 MG
600 TABLET ORAL EVERY 6 HOURS
Refills: 0 | Status: DISCONTINUED | OUTPATIENT
Start: 2019-05-21 | End: 2019-05-23

## 2019-05-21 RX ADMIN — HEPARIN SODIUM 5000 UNIT(S): 5000 INJECTION INTRAVENOUS; SUBCUTANEOUS at 17:34

## 2019-05-21 RX ADMIN — Medication 600 MILLIGRAM(S): at 07:38

## 2019-05-21 RX ADMIN — Medication 325 MILLIGRAM(S): at 11:50

## 2019-05-21 RX ADMIN — Medication 975 MILLIGRAM(S): at 15:38

## 2019-05-21 RX ADMIN — Medication 600 MILLIGRAM(S): at 11:50

## 2019-05-21 RX ADMIN — Medication 25 MILLIGRAM(S): at 09:16

## 2019-05-21 RX ADMIN — Medication 975 MILLIGRAM(S): at 15:06

## 2019-05-21 RX ADMIN — Medication 25 MILLIGRAM(S): at 03:38

## 2019-05-21 RX ADMIN — Medication 100 MILLIGRAM(S): at 11:50

## 2019-05-21 RX ADMIN — Medication 600 MILLIGRAM(S): at 17:34

## 2019-05-21 RX ADMIN — Medication 600 MILLIGRAM(S): at 06:38

## 2019-05-21 RX ADMIN — Medication 975 MILLIGRAM(S): at 21:22

## 2019-05-21 RX ADMIN — HEPARIN SODIUM 5000 UNIT(S): 5000 INJECTION INTRAVENOUS; SUBCUTANEOUS at 06:38

## 2019-05-21 RX ADMIN — Medication 1 TABLET(S): at 11:50

## 2019-05-21 RX ADMIN — Medication 975 MILLIGRAM(S): at 09:33

## 2019-05-21 RX ADMIN — Medication 600 MILLIGRAM(S): at 00:45

## 2019-05-21 RX ADMIN — Medication 600 MILLIGRAM(S): at 12:50

## 2019-05-21 RX ADMIN — Medication 975 MILLIGRAM(S): at 22:30

## 2019-05-21 RX ADMIN — Medication 25 MILLIGRAM(S): at 15:05

## 2019-05-21 RX ADMIN — Medication 975 MILLIGRAM(S): at 08:33

## 2019-05-21 RX ADMIN — Medication 600 MILLIGRAM(S): at 18:10

## 2019-05-21 RX ADMIN — Medication 25 MICROGRAM(S): at 06:37

## 2019-05-21 RX ADMIN — Medication 600 MILLIGRAM(S): at 01:45

## 2019-05-21 NOTE — PROGRESS NOTE ADULT - REASON FOR ADMISSION
[facility]    [address]     [place] [address]    Phone:  [phone]                                       After Visit Summary   3/6/2018    Rylen Scott Marty    MRN: [id number]           After Visit Summary Signature Page     I have received my discharge instructions, and my questions have been answered. I have discussed any challenges I see with this plan with the nurse or doctor.    ..........................................................................................................................................  Patient/Patient Representative Signature      ..........................................................................................................................................  Patient Representative Print Name and Relationship to Patient    ..................................................               ................................................  Date                                            Time    ..........................................................................................................................................  Reviewed by Signature/Title    ...................................................              ..............................................  Date                                                            Time           repeat section

## 2019-05-21 NOTE — PROGRESS NOTE ADULT - ASSESSMENT
43y Female POD#1    s/p C/S, Uncomplicated                                       1. Neuro/Pain:  OPM  2  CV:  VS per routine  3. Pulm: Encourage ISS & Ambulation  4. GI:  Advance as stella  5. : Wesley in place, TOV  6. DVT ppx: SCDs, SQH 5000 mg BID  7. Dispo: POD #3 or #4

## 2019-05-22 ENCOUNTER — TRANSCRIPTION ENCOUNTER (OUTPATIENT)
Age: 44
End: 2019-05-22

## 2019-05-22 RX ORDER — TETANUS TOXOID, REDUCED DIPHTHERIA TOXOID AND ACELLULAR PERTUSSIS VACCINE, ADSORBED 5; 2.5; 8; 8; 2.5 [IU]/.5ML; [IU]/.5ML; UG/.5ML; UG/.5ML; UG/.5ML
0.5 SUSPENSION INTRAMUSCULAR ONCE
Refills: 0 | Status: COMPLETED | OUTPATIENT
Start: 2019-05-22 | End: 2019-05-22

## 2019-05-22 RX ORDER — LEVOTHYROXINE SODIUM 125 MCG
1 TABLET ORAL
Qty: 0 | Refills: 0 | DISCHARGE

## 2019-05-22 RX ADMIN — TETANUS TOXOID, REDUCED DIPHTHERIA TOXOID AND ACELLULAR PERTUSSIS VACCINE, ADSORBED 0.5 MILLILITER(S): 5; 2.5; 8; 8; 2.5 SUSPENSION INTRAMUSCULAR at 07:53

## 2019-05-22 RX ADMIN — Medication 600 MILLIGRAM(S): at 06:16

## 2019-05-22 RX ADMIN — Medication 100 MILLIGRAM(S): at 00:30

## 2019-05-22 RX ADMIN — Medication 600 MILLIGRAM(S): at 17:55

## 2019-05-22 RX ADMIN — Medication 975 MILLIGRAM(S): at 21:08

## 2019-05-22 RX ADMIN — Medication 975 MILLIGRAM(S): at 16:30

## 2019-05-22 RX ADMIN — Medication 975 MILLIGRAM(S): at 09:11

## 2019-05-22 RX ADMIN — Medication 100 MILLIGRAM(S): at 12:21

## 2019-05-22 RX ADMIN — Medication 600 MILLIGRAM(S): at 13:20

## 2019-05-22 RX ADMIN — Medication 600 MILLIGRAM(S): at 01:30

## 2019-05-22 RX ADMIN — HEPARIN SODIUM 5000 UNIT(S): 5000 INJECTION INTRAVENOUS; SUBCUTANEOUS at 06:16

## 2019-05-22 RX ADMIN — Medication 975 MILLIGRAM(S): at 10:00

## 2019-05-22 RX ADMIN — Medication 975 MILLIGRAM(S): at 04:20

## 2019-05-22 RX ADMIN — Medication 600 MILLIGRAM(S): at 07:28

## 2019-05-22 RX ADMIN — Medication 1 TABLET(S): at 12:21

## 2019-05-22 RX ADMIN — Medication 25 MICROGRAM(S): at 06:16

## 2019-05-22 RX ADMIN — Medication 325 MILLIGRAM(S): at 12:21

## 2019-05-22 RX ADMIN — SIMETHICONE 80 MILLIGRAM(S): 80 TABLET, CHEWABLE ORAL at 00:30

## 2019-05-22 RX ADMIN — HEPARIN SODIUM 5000 UNIT(S): 5000 INJECTION INTRAVENOUS; SUBCUTANEOUS at 17:55

## 2019-05-22 RX ADMIN — Medication 975 MILLIGRAM(S): at 22:02

## 2019-05-22 RX ADMIN — Medication 600 MILLIGRAM(S): at 12:22

## 2019-05-22 RX ADMIN — Medication 975 MILLIGRAM(S): at 03:33

## 2019-05-22 RX ADMIN — Medication 600 MILLIGRAM(S): at 18:50

## 2019-05-22 RX ADMIN — Medication 600 MILLIGRAM(S): at 00:30

## 2019-05-22 RX ADMIN — SIMETHICONE 80 MILLIGRAM(S): 80 TABLET, CHEWABLE ORAL at 12:25

## 2019-05-22 RX ADMIN — Medication 975 MILLIGRAM(S): at 15:34

## 2019-05-22 NOTE — LACTATION INITIAL EVALUATION - NS LACT CON REASON FOR REQ
Pt. is a P2 at 39 wks. via repeat . Bbay is >than 48 hrs. old has had 7 voids, 6 stools, weight loss7.2%.   This is second time attempting to assess baby's latch.   Pt. states this feed was about the same as previous feed.  Pt. is aware of discomfort at breast and bruising is noted on both nipples.  Discussed with pt. to observe for proper alignment of baby to pt.,  and proper alignment of baby's nose to pt.'s nipple.   Infoermed pt. if it feels as if baby is on nipple to remove baby and attempt to relatch.   LC to see pt. tomorrow before dc/multiparous mom/general questions without assessment general questions without assessment/multiparous mom/Pt. is a P2 at 39 wks. via repeat . Baby is >than 48 hrs. old has had 7 voids, 6 stools, weight loss7.2%.   This is second time attempting to assess baby's latch.   Pt. states this feed was about the same as previous feed.  Pt. is aware of discomfort at breast and bruising is noted on both nipples.  Discussed with pt. to observe for proper alignment of baby to pt.,  and proper alignment of baby's nose to pt.'s nipple.   Infoermed pt. if it feels as if baby is on nipple to remove baby and attempt to relatch.   LC to see pt. tomorrow before dc

## 2019-05-22 NOTE — LACTATION INITIAL EVALUATION - NIPPLE ASSESSMENT (LEFT)
small markings noted on areola bilaterally, pt. states noticed few weeks prior to delivery.  They appear small,slightly raised with breastfeeding don not appear irritated.  Color is white or light color than areola./compressible/medium/sore/bruised

## 2019-05-22 NOTE — PROGRESS NOTE ADULT - ASSESSMENT
43y Female POD#2    s/p C/S, Uncomplicated                                       1. Neuro/Pain:  OPM  2  CV:  VS per routine  3. Pulm: Encourage ISS & Ambulation  4. GI:  Reg  5. : Voiding  6. DVT ppx: SCDs, SQH 5000 mg BID  7. Dispo: POD #3 or #4

## 2019-05-22 NOTE — DISCHARGE NOTE OB - CARE PLAN
Principal Discharge DX:	Postpartum state  Goal:	to feel well  Assessment and plan of treatment:	meeting all postpartum milestones. safe for d/c, to f/u in office

## 2019-05-22 NOTE — DISCHARGE NOTE OB - PATIENT PORTAL LINK FT
You can access the [facility] Patient Portal, offered by [facility], by registering with the following website: [email or web address]

## 2019-05-22 NOTE — DISCHARGE NOTE OB - CARE PROVIDER_API CALL
Steph Thornton)  Obstetrics and Gynecology  [address] [place] [id number]  Phone: [phone]  Fax: [phone]  Follow Up Time:

## 2019-05-23 VITALS
TEMPERATURE: 98 F | RESPIRATION RATE: 18 BRPM | DIASTOLIC BLOOD PRESSURE: 66 MMHG | HEART RATE: 65 BPM | SYSTOLIC BLOOD PRESSURE: 103 MMHG | OXYGEN SATURATION: 97 %

## 2019-05-23 PROCEDURE — 85025 COMPLETE CBC W/AUTO DIFF WBC: CPT

## 2019-05-23 PROCEDURE — 88307 TISSUE EXAM BY PATHOLOGIST: CPT

## 2019-05-23 PROCEDURE — 86901 BLOOD TYPING SEROLOGIC RH(D): CPT

## 2019-05-23 PROCEDURE — C1765: CPT

## 2019-05-23 PROCEDURE — 86780 TREPONEMA PALLIDUM: CPT

## 2019-05-23 PROCEDURE — 86850 RBC ANTIBODY SCREEN: CPT

## 2019-05-23 PROCEDURE — 36415 COLL VENOUS BLD VENIPUNCTURE: CPT

## 2019-05-23 PROCEDURE — 86900 BLOOD TYPING SEROLOGIC ABO: CPT

## 2019-05-23 PROCEDURE — 90707 MMR VACCINE SC: CPT

## 2019-05-23 PROCEDURE — 90715 TDAP VACCINE 7 YRS/> IM: CPT

## 2019-05-23 RX ADMIN — Medication 325 MILLIGRAM(S): at 11:08

## 2019-05-23 RX ADMIN — Medication 975 MILLIGRAM(S): at 04:00

## 2019-05-23 RX ADMIN — Medication 100 MILLIGRAM(S): at 11:08

## 2019-05-23 RX ADMIN — Medication 975 MILLIGRAM(S): at 15:01

## 2019-05-23 RX ADMIN — Medication 600 MILLIGRAM(S): at 00:40

## 2019-05-23 RX ADMIN — HEPARIN SODIUM 5000 UNIT(S): 5000 INJECTION INTRAVENOUS; SUBCUTANEOUS at 05:58

## 2019-05-23 RX ADMIN — Medication 600 MILLIGRAM(S): at 01:30

## 2019-05-23 RX ADMIN — Medication 600 MILLIGRAM(S): at 11:58

## 2019-05-23 RX ADMIN — Medication 600 MILLIGRAM(S): at 11:09

## 2019-05-23 RX ADMIN — Medication 25 MICROGRAM(S): at 05:58

## 2019-05-23 RX ADMIN — Medication 975 MILLIGRAM(S): at 02:58

## 2019-05-23 RX ADMIN — Medication 1 APPLICATION(S): at 15:05

## 2019-05-23 RX ADMIN — Medication 975 MILLIGRAM(S): at 15:33

## 2019-05-23 RX ADMIN — Medication 0.5 MILLILITER(S): at 11:09

## 2019-05-23 RX ADMIN — Medication 1 TABLET(S): at 11:08

## 2019-05-23 NOTE — PROGRESS NOTE ADULT - SUBJECTIVE AND OBJECTIVE BOX
Patient evaluated at bedside. No acute events overnight. She reports pain is well controlled with OPM. Adequate urine output.     Physical Exam:  Vital Signs Last 24 Hrs  T(C): 37 (23 May 2019 06:06), Max: 37 (23 May 2019 06:06)  T(F): 98.6 (23 May 2019 06:06), Max: 98.6 (23 May 2019 06:06)  HR: 57 (23 May 2019 06:06) (55 - 59)  BP: 105/70 (23 May 2019 06:06) (104/67 - 106/67)  BP(mean): --  RR: 17 (23 May 2019 06:06) (16 - 17)  SpO2: 97% (23 May 2019 06:06) (97% - 100%)    GA: NAD, A+0 x 3  Abd: + BS, soft, nontender, nondistended, no rebound or guarding, uterus firm at midline, 2 fb below umbilicus  Incision clean, dry and intact  : lochia WNL  Extremities: no swelling or calf tenderness    A/P  yo 43y s/p c/s, POD #3  ,stable    Diet: regular  Pain: OPM  OOB/SCDs/IS  Continue to monitor  Anticipate discharge
Patient evaluated at bedside.   She reports pain is well controlled with OPM.  She has been ambulating without assistance, voiding spontaneously, passing gas, tolerating regular diet and is breastfeeding.    She denies HA, dizziness, CP, palpitations, SOB, n/v, or heavy vaginal bleeding.    Physical Exam:  Vital Signs Last 24 Hrs  T(C): 36.4 (21 May 2019 18:10), Max: 36.4 (21 May 2019 14:50)  T(F): 97.6 (21 May 2019 18:10), Max: 97.6 (21 May 2019 18:10)  HR: 60 (21 May 2019 18:10) (54 - 60)  BP: 126/60 (21 May 2019 18:10) (126/60 - 137/55)  BP(mean): --  RR: 18 (21 May 2019 18:10) (18 - 18)  SpO2: 99% (21 May 2019 18:10) (99% - 99%)    Gen: NAD  Abd: + BS, soft, nontender, nondistended, no rebound or guarding  Incision clean, dry and intact  uterus firm at midline  : lochia WNL  Extremities: no swelling or calf tenderness                          10.4   10.98 )-----------( 182      ( 21 May 2019 05:46 )             31.3
Patient evaluated at bedside.   She reports pain is well controlled with OPM.  She has been ambulating without assistance, voiding spontaneously, passing gas, tolerating regular diet and is breastfeeding.    She denies HA, dizziness, CP, palpitations, SOB, n/v, or heavy vaginal bleeding.    Physical Exam:  Vital Signs Last 24 Hrs  T(C): 37 (23 May 2019 06:06), Max: 37 (23 May 2019 06:06)  T(F): 98.6 (23 May 2019 06:06), Max: 98.6 (23 May 2019 06:06)  HR: 57 (23 May 2019 06:06) (55 - 59)  BP: 105/70 (23 May 2019 06:06) (104/67 - 106/67)  BP(mean): --  RR: 17 (23 May 2019 06:06) (16 - 17)  SpO2: 97% (23 May 2019 06:06) (97% - 100%)    Gen: NAD  Abd: + BS, soft, nontender, nondistended, no rebound or guarding  Incision clean, dry and intact  uterus firm at midline  : lochia WNL  Extremities: no swelling or calf tenderness
Patient evaluated at bedside.   She reports pain is well controlled with OPM.  She has been ambulating without assistance, voiding spontaneously, passing gas, tolerating regular diet and is breastfeeding.    She denies HA, dizziness, CP, palpitations, SOB, n/v, or heavy vaginal bleeding.    Physical Exam:  vss  Gen: NAD  Abd: + BS, soft, nontender, nondistended, no rebound or guarding  Incision clean, dry and intact  uterus firm at midline,   fb below umbilicus  : lochia WNL  Extremities: no swelling or calf tenderness    pod2 stable
Patient evaluated at bedside.   She reports pain is well controlled.  Wesley in place  No Flatus  Not OOB yet.  She denies HA, dizziness, CP, palpitations, SOB, n/v, or heavy vaginal bleeding.    Physical Exam:  Vital Signs Last 24 Hrs  T(C): 36.6 (21 May 2019 06:25), Max: 36.6 (20 May 2019 19:20)  T(F): 97.9 (21 May 2019 06:25), Max: 97.9 (20 May 2019 19:20)  HR: 54 (21 May 2019 06:25) (54 - 75)  BP: 94/56 (21 May 2019 06:25) (94/56 - 108/68)  BP(mean): --  RR: 18 (21 May 2019 06:25) (16 - 18)  SpO2: 97% (21 May 2019 06:25) (92% - 97%)    Gen: NAD  Abd: + BS, soft, nontender, nondistended, no rebound or guarding  Incision clean, dry and intact  uterus firm at midline  : lochia WNL  Extremities: no swelling or calf tenderness                          10.4   10.98 )-----------( 182      ( 21 May 2019 05:46 )             31.3

## 2019-05-23 NOTE — PROGRESS NOTE ADULT - ASSESSMENT
43y Female POD#3  s/p C/S, Uncomplicated                                       1. Neuro/Pain:  OPM  2  CV:  VS per routine  3. Pulm: Encourage ISS & Ambulation  4. GI:  Reg  5. : Voiding  6. DVT ppx: SCDs, SQH 5000 mg BID  7. Dispo: POD #3 or #4

## 2019-05-24 LAB — SURGICAL PATHOLOGY STUDY: SIGNIFICANT CHANGE UP

## 2019-05-28 DIAGNOSIS — O34.211 MATERNAL CARE FOR LOW TRANSVERSE SCAR FROM PREVIOUS CESAREAN DELIVERY: ICD-10-CM

## 2019-05-28 DIAGNOSIS — E03.8 OTHER SPECIFIED HYPOTHYROIDISM: ICD-10-CM

## 2019-05-28 DIAGNOSIS — Z3A.39 39 WEEKS GESTATION OF PREGNANCY: ICD-10-CM

## 2020-12-16 NOTE — PROGRESS NOTE ADULT - PROVIDER SPECIALTY LIST ADULT
12/16/20    Patient: Tracy Leyden  YOB: 1962  Date of Visit: 12/16/20      To Jignesh Carrasquillo, DO:    Your patient, Tracy Leyden, has enrolled in the [facility] COVID symptom monitoring program administered by [facility]. Your patient will be monitored remotely by a team of clinicians and will appropriately refer them to home care, immediate care, or the ED. The patient will be encouraged to follow-up with you, their PCP, for ongoing medical needs.    The most recent clinical note is attached to this letter.    If you have any questions, please reach out to us at [phone].    Sincerely,    Vesna Carrillo CNP  12/16/20      
OB

## 2023-01-14 NOTE — DISCHARGE NOTE OB - AVOID DOUCHING OR TAMPONS UNTIL YOUR POSTPARTUM VISIT
Alert-The patient is alert, awake and responds to voice. The patient is oriented to time, place, and person. The triage nurse is able to obtain subjective information. pt. in NAD at this time, report given to JEAN MARIE Gold Statement Selected
